# Patient Record
Sex: FEMALE | Race: WHITE | NOT HISPANIC OR LATINO | Employment: OTHER | URBAN - METROPOLITAN AREA
[De-identification: names, ages, dates, MRNs, and addresses within clinical notes are randomized per-mention and may not be internally consistent; named-entity substitution may affect disease eponyms.]

---

## 2017-01-03 RX ORDER — ANASTROZOLE 1 MG/1
TABLET ORAL
Qty: 30 TABLET | Refills: 0 | Status: SHIPPED | OUTPATIENT
Start: 2017-01-03 | End: 2017-01-11 | Stop reason: SDUPTHER

## 2017-01-11 RX ORDER — ANASTROZOLE 1 MG/1
1 TABLET ORAL DAILY
Qty: 30 TABLET | Refills: 0 | Status: SHIPPED | OUTPATIENT
Start: 2017-01-11 | End: 2017-01-31 | Stop reason: SDUPTHER

## 2017-01-11 NOTE — TELEPHONE ENCOUNTER
Refill request for Arimidex to be sent to [facility] pharmacy. Patient changing pharmacies. Refill escribed to [facility].

## 2017-01-31 RX ORDER — ANASTROZOLE 1 MG/1
TABLET ORAL
Qty: 30 TABLET | Refills: 2 | Status: SHIPPED | OUTPATIENT
Start: 2017-01-31 | End: 2018-07-16 | Stop reason: ALTCHOICE

## 2017-03-14 ENCOUNTER — OUTSIDE FACILITY SERVICE (OUTPATIENT)
Dept: FAMILY MEDICINE CLINIC | Facility: CLINIC | Age: 77
End: 2017-03-14

## 2017-03-14 PROCEDURE — 99305 1ST NF CARE MODERATE MDM 35: CPT | Performed by: INTERNAL MEDICINE

## 2017-03-16 RX ORDER — ALPRAZOLAM 1 MG/1
1 TABLET ORAL NIGHTLY PRN
Qty: 30 TABLET | Refills: 0 | Status: SHIPPED | OUTPATIENT
Start: 2017-03-16 | End: 2017-05-10 | Stop reason: SDUPTHER

## 2017-03-16 RX ORDER — HYDROCODONE BITARTRATE AND ACETAMINOPHEN 5; 325 MG/1; MG/1
1 TABLET ORAL EVERY 8 HOURS PRN
Qty: 90 TABLET | Refills: 0 | Status: SHIPPED | OUTPATIENT
Start: 2017-03-16 | End: 2017-06-02 | Stop reason: SDUPTHER

## 2017-04-11 ENCOUNTER — OUTSIDE FACILITY SERVICE (OUTPATIENT)
Dept: FAMILY MEDICINE CLINIC | Facility: CLINIC | Age: 77
End: 2017-04-11

## 2017-04-11 PROCEDURE — 99307 SBSQ NF CARE SF MDM 10: CPT | Performed by: NURSE PRACTITIONER

## 2017-05-10 RX ORDER — ALPRAZOLAM 1 MG/1
1 TABLET ORAL NIGHTLY PRN
Qty: 30 TABLET | Refills: 1 | Status: SHIPPED | OUTPATIENT
Start: 2017-05-10 | End: 2017-05-23 | Stop reason: SDUPTHER

## 2017-05-23 RX ORDER — ALPRAZOLAM 1 MG/1
1 TABLET ORAL NIGHTLY PRN
Qty: 30 TABLET | Refills: 0 | Status: SHIPPED | OUTPATIENT
Start: 2017-05-23

## 2017-06-02 RX ORDER — HYDROCODONE BITARTRATE AND ACETAMINOPHEN 5; 325 MG/1; MG/1
1 TABLET ORAL EVERY 8 HOURS PRN
Qty: 90 TABLET | Refills: 0 | Status: SHIPPED | OUTPATIENT
Start: 2017-06-02 | End: 2017-06-26 | Stop reason: SDUPTHER

## 2017-06-26 RX ORDER — HYDROCODONE BITARTRATE AND ACETAMINOPHEN 5; 325 MG/1; MG/1
1 TABLET ORAL EVERY 8 HOURS PRN
Qty: 90 TABLET | Refills: 0 | Status: SHIPPED | OUTPATIENT
Start: 2017-06-26

## 2017-06-30 ENCOUNTER — OUTSIDE FACILITY SERVICE (OUTPATIENT)
Dept: FAMILY MEDICINE CLINIC | Facility: CLINIC | Age: 77
End: 2017-06-30

## 2017-06-30 PROCEDURE — 99308 SBSQ NF CARE LOW MDM 20: CPT | Performed by: NURSE PRACTITIONER

## 2018-06-25 ENCOUNTER — OFFICE VISIT (OUTPATIENT)
Dept: CARDIOLOGY | Facility: CLINIC | Age: 78
End: 2018-06-25

## 2018-06-25 ENCOUNTER — HOSPITAL ENCOUNTER (OUTPATIENT)
Dept: CARDIOLOGY | Facility: HOSPITAL | Age: 78
Discharge: HOME | End: 2018-06-25
Attending: INTERNAL MEDICINE | Admitting: INTERNAL MEDICINE

## 2018-06-25 VITALS
BODY MASS INDEX: 27.75 KG/M2 | WEIGHT: 147 LBS | HEIGHT: 61 IN | HEART RATE: 94 BPM | SYSTOLIC BLOOD PRESSURE: 148 MMHG | DIASTOLIC BLOOD PRESSURE: 88 MMHG

## 2018-06-25 DIAGNOSIS — R06.09 DYSPNEA ON EXERTION: ICD-10-CM

## 2018-06-25 DIAGNOSIS — I48.0 PAROXYSMAL ATRIAL FIBRILLATION (HCC): ICD-10-CM

## 2018-06-25 DIAGNOSIS — I44.7 LBBB (LEFT BUNDLE BRANCH BLOCK): ICD-10-CM

## 2018-06-25 DIAGNOSIS — I44.7 LBBB (LEFT BUNDLE BRANCH BLOCK): Primary | ICD-10-CM

## 2018-06-25 LAB
ASCENDING AORTA: 2.9 CM
BH CV ECHO MEAS - ACS: 2 CM
BH CV ECHO MEAS - AO MAX PG (FULL): 5.3 MMHG
BH CV ECHO MEAS - AO MAX PG: 6.8 MMHG
BH CV ECHO MEAS - AO MEAN PG (FULL): 2.5 MMHG
BH CV ECHO MEAS - AO MEAN PG: 3.4 MMHG
BH CV ECHO MEAS - AO ROOT AREA (BSA CORRECTED): 1.9
BH CV ECHO MEAS - AO ROOT AREA: 7.8 CM^2
BH CV ECHO MEAS - AO ROOT DIAM: 3.1 CM
BH CV ECHO MEAS - AO V2 MAX: 130.3 CM/SEC
BH CV ECHO MEAS - AO V2 MEAN: 85 CM/SEC
BH CV ECHO MEAS - AO V2 VTI: 28.1 CM
BH CV ECHO MEAS - AVA(I,A): 1.4 CM^2
BH CV ECHO MEAS - AVA(I,D): 1.4 CM^2
BH CV ECHO MEAS - AVA(V,A): 1.4 CM^2
BH CV ECHO MEAS - AVA(V,D): 1.4 CM^2
BH CV ECHO MEAS - BSA(HAYCOCK): 1.7 M^2
BH CV ECHO MEAS - BSA: 1.7 M^2
BH CV ECHO MEAS - BZI_BMI: 27.8 KILOGRAMS/M^2
BH CV ECHO MEAS - BZI_METRIC_HEIGHT: 154.9 CM
BH CV ECHO MEAS - BZI_METRIC_WEIGHT: 66.7 KG
BH CV ECHO MEAS - CONTRAST EF (2CH): 35.4 ML/M^2
BH CV ECHO MEAS - CONTRAST EF 4CH: 44 ML/M^2
BH CV ECHO MEAS - EDV(MOD-SP2): 113 ML
BH CV ECHO MEAS - EDV(MOD-SP4): 134 ML
BH CV ECHO MEAS - EDV(TEICH): 97.8 ML
BH CV ECHO MEAS - EF(CUBED): 59.2 %
BH CV ECHO MEAS - EF(MOD-BP): 39 %
BH CV ECHO MEAS - EF(MOD-SP2): 35.4 %
BH CV ECHO MEAS - EF(MOD-SP4): 44 %
BH CV ECHO MEAS - EF(TEICH): 50.9 %
BH CV ECHO MEAS - ESV(MOD-SP2): 73 ML
BH CV ECHO MEAS - ESV(MOD-SP4): 75 ML
BH CV ECHO MEAS - ESV(TEICH): 48.1 ML
BH CV ECHO MEAS - FS: 25.8 %
BH CV ECHO MEAS - IVS/LVPW: 1.1
BH CV ECHO MEAS - IVSD: 0.96 CM
BH CV ECHO MEAS - LAT PEAK E' VEL: 4 CM/SEC
BH CV ECHO MEAS - LV DIASTOLIC VOL/BSA (35-75): 80.8 ML/M^2
BH CV ECHO MEAS - LV MASS(C)D: 138.7 GRAMS
BH CV ECHO MEAS - LV MASS(C)DI: 83.7 GRAMS/M^2
BH CV ECHO MEAS - LV MAX PG: 1.4 MMHG
BH CV ECHO MEAS - LV MEAN PG: 0.9 MMHG
BH CV ECHO MEAS - LV SYSTOLIC VOL/BSA (12-30): 45.3 ML/M^2
BH CV ECHO MEAS - LV V1 MAX: 60.1 CM/SEC
BH CV ECHO MEAS - LV V1 MEAN: 44 CM/SEC
BH CV ECHO MEAS - LV V1 VTI: 13.2 CM
BH CV ECHO MEAS - LVIDD: 4.6 CM
BH CV ECHO MEAS - LVIDS: 3.4 CM
BH CV ECHO MEAS - LVLD AP2: 6.6 CM
BH CV ECHO MEAS - LVLD AP4: 7 CM
BH CV ECHO MEAS - LVLS AP2: 6.1 CM
BH CV ECHO MEAS - LVLS AP4: 6.4 CM
BH CV ECHO MEAS - LVOT AREA (M): 2.8 CM^2
BH CV ECHO MEAS - LVOT AREA: 2.9 CM^2
BH CV ECHO MEAS - LVOT DIAM: 1.9 CM
BH CV ECHO MEAS - LVPWD: 0.85 CM
BH CV ECHO MEAS - MED PEAK E' VEL: 4 CM/SEC
BH CV ECHO MEAS - MR MAX PG: 99.8 MMHG
BH CV ECHO MEAS - MR MAX VEL: 499.5 CM/SEC
BH CV ECHO MEAS - MV A DUR: 0.12 SEC
BH CV ECHO MEAS - MV A MAX VEL: 117.4 CM/SEC
BH CV ECHO MEAS - MV DEC SLOPE: 613.6 CM/SEC^2
BH CV ECHO MEAS - MV DEC TIME: 0.14 SEC
BH CV ECHO MEAS - MV E MAX VEL: 92.6 CM/SEC
BH CV ECHO MEAS - MV E/A: 0.79
BH CV ECHO MEAS - MV MAX PG: 6.8 MMHG
BH CV ECHO MEAS - MV MEAN PG: 2.9 MMHG
BH CV ECHO MEAS - MV P1/2T MAX VEL: 94.1 CM/SEC
BH CV ECHO MEAS - MV P1/2T: 44.9 MSEC
BH CV ECHO MEAS - MV V2 MAX: 130.3 CM/SEC
BH CV ECHO MEAS - MV V2 MEAN: 78.1 CM/SEC
BH CV ECHO MEAS - MV V2 VTI: 33 CM
BH CV ECHO MEAS - MVA P1/2T LCG: 2.3 CM^2
BH CV ECHO MEAS - MVA(P1/2T): 4.9 CM^2
BH CV ECHO MEAS - MVA(VTI): 1.2 CM^2
BH CV ECHO MEAS - PA ACC TIME: 0.13 SEC
BH CV ECHO MEAS - PA MAX PG (FULL): 0.57 MMHG
BH CV ECHO MEAS - PA MAX PG: 2.3 MMHG
BH CV ECHO MEAS - PA PR(ACCEL): 20.4 MMHG
BH CV ECHO MEAS - PA V2 MAX: 76 CM/SEC
BH CV ECHO MEAS - PULM A REVS DUR: 0.07 SEC
BH CV ECHO MEAS - PULM A REVS VEL: 36.9 CM/SEC
BH CV ECHO MEAS - PULM DIAS VEL: 40.2 CM/SEC
BH CV ECHO MEAS - PULM S/D: 0.79
BH CV ECHO MEAS - PULM SYS VEL: 31.8 CM/SEC
BH CV ECHO MEAS - PVA(V,A): 2.8 CM^2
BH CV ECHO MEAS - PVA(V,D): 2.8 CM^2
BH CV ECHO MEAS - QP/QS: 1.1
BH CV ECHO MEAS - RAP SYSTOLE: 3 MMHG
BH CV ECHO MEAS - RV MAX PG: 1.7 MMHG
BH CV ECHO MEAS - RV MEAN PG: 1 MMHG
BH CV ECHO MEAS - RV V1 MAX: 66 CM/SEC
BH CV ECHO MEAS - RV V1 MEAN: 48.6 CM/SEC
BH CV ECHO MEAS - RV V1 VTI: 12.7 CM
BH CV ECHO MEAS - RVOT AREA: 3.3 CM^2
BH CV ECHO MEAS - RVOT DIAM: 2 CM
BH CV ECHO MEAS - SI(AO): 131.9 ML/M^2
BH CV ECHO MEAS - SI(CUBED): 35 ML/M^2
BH CV ECHO MEAS - SI(LVOT): 23.4 ML/M^2
BH CV ECHO MEAS - SI(MOD-SP2): 24.1 ML/M^2
BH CV ECHO MEAS - SI(MOD-SP4): 35.6 ML/M^2
BH CV ECHO MEAS - SI(TEICH): 30 ML/M^2
BH CV ECHO MEAS - SUP REN AO DIAM: 1.9 CM
BH CV ECHO MEAS - SV(AO): 218.7 ML
BH CV ECHO MEAS - SV(CUBED): 58 ML
BH CV ECHO MEAS - SV(LVOT): 38.8 ML
BH CV ECHO MEAS - SV(MOD-SP2): 40 ML
BH CV ECHO MEAS - SV(MOD-SP4): 59 ML
BH CV ECHO MEAS - SV(RVOT): 41.5 ML
BH CV ECHO MEAS - SV(TEICH): 49.7 ML
BH CV ECHO MEAS - TAPSE (>1.6): 2.6 CM2
BH CV ECHO MEASUREMENTS AVERAGE E/E' RATIO: 23.15
BH CV XLRA - RV BASE: 2.8 CM
BH CV XLRA - TDI S': 9 CM/SEC
LEFT ATRIUM VOLUME INDEX: 24 ML/M2
LV EF 2D ECHO EST: 40 %
MAXIMAL PREDICTED HEART RATE: 144 BPM
SINUS: 2.6 CM
STJ: 2.1 CM
STRESS TARGET HR: 122 BPM

## 2018-06-25 PROCEDURE — 93000 ELECTROCARDIOGRAM COMPLETE: CPT | Performed by: INTERNAL MEDICINE

## 2018-06-25 PROCEDURE — 93306 TTE W/DOPPLER COMPLETE: CPT

## 2018-06-25 PROCEDURE — 99204 OFFICE O/P NEW MOD 45 MIN: CPT | Performed by: INTERNAL MEDICINE

## 2018-06-25 PROCEDURE — 93306 TTE W/DOPPLER COMPLETE: CPT | Performed by: INTERNAL MEDICINE

## 2018-06-25 PROCEDURE — 25010000002 PERFLUTREN (DEFINITY) 8.476 MG IN SODIUM CHLORIDE 0.9 % 10 ML INJECTION: Performed by: INTERNAL MEDICINE

## 2018-06-25 RX ORDER — MIRTAZAPINE 15 MG/1
15 TABLET, FILM COATED ORAL NIGHTLY
COMMUNITY

## 2018-06-25 RX ORDER — OMEPRAZOLE 40 MG/1
40 CAPSULE, DELAYED RELEASE ORAL DAILY
COMMUNITY

## 2018-06-25 RX ORDER — ZOLPIDEM TARTRATE 5 MG/1
5 TABLET ORAL NIGHTLY PRN
COMMUNITY

## 2018-06-25 RX ORDER — ASPIRIN 81 MG/1
81 TABLET ORAL DAILY
COMMUNITY

## 2018-06-25 RX ORDER — LORATADINE 10 MG/1
1 CAPSULE, LIQUID FILLED ORAL DAILY
COMMUNITY

## 2018-06-25 RX ADMIN — PERFLUTREN 1.5 ML: 6.52 INJECTION, SUSPENSION INTRAVENOUS at 12:45

## 2018-06-25 NOTE — PROGRESS NOTES
Marah JAVIER Joyce  1940  Date of Office Visit: 06/25/2018  Encounter Provider: Franco Araujo MD  Place of Service: [facility] CARDIOLOGY      CHIEF COMPLAINT:  Left bundle-branch block  Reported history of heart failure    HISTORY OF PRESENT ILLNESS:Ms. Love:    I had the pleasure of seeing the patient in consultation today.  As you well know, she is a very pleasant, 78-year-old female with a medical history of breast malignancy with prior Adriamycin therapy, osteopenia, and osteoarthritis who presents to me with a report of an isolated episode of shortness of breath about three months ago and a questionable history of congestive heart failure.  She states that, about three months ago, she had shortness of air after walking from back from the bathroom.  She denied any chest pain.  This actually resolved with rest.  She has had no orthopnea or paroxysmal nocturnal dyspnea.  She denies lower extremity edema.  She has a previously documented normal ejection fraction on MUGA scans.     She denies any chest pain or dyspnea on exertion currently.  She has had no recurrence of that shortness of breath.         Review of Systems   Constitution: Negative for fever, weakness and malaise/fatigue.   HENT: Negative for nosebleeds and sore throat.    Eyes: Negative for blurred vision and double vision.   Cardiovascular: Negative for chest pain, claudication, palpitations and syncope.   Respiratory: Negative for cough, shortness of breath and snoring.    Endocrine: Negative for cold intolerance, heat intolerance and polydipsia.   Skin: Negative for itching, poor wound healing and rash.   Musculoskeletal: Negative for joint pain, joint swelling, muscle weakness and myalgias.   Gastrointestinal: Negative for abdominal pain, melena, nausea and vomiting.   Neurological: Negative for light-headedness, loss of balance, seizures and vertigo.   Psychiatric/Behavioral: Negative for altered mental  status and depression.          Past Medical History:   Diagnosis Date   • Anemia    • Anxiety    • Arthritis    • Cancer     Stage IIIA, T1N2M0 right breast   • Cellulitis of right upper arm    • Cerebral aneurysm    • Depression    • Diabetes mellitus     type II   • Diabetic neuropathy    • Difficulty walking    • DVT (deep venous thrombosis) 2011    Left upper extremity   • GERD (gastroesophageal reflux disease)    • Heart failure    • Hypercholesteremia    • Hyperlipidemia    • Hypertension    • Hypokalemia    • Lack of coordination    • Muscle weakness    • Osteopenia    • Synovial cyst of popliteal space (Baker), left knee    • Synovial cyst of popliteal space (Baker), right knee    • Thrombocytopenia        The following portions of the patient's history were reviewed and updated as appropriate: Social history , Family history and Surgical history     Current Outpatient Prescriptions on File Prior to Visit   Medication Sig Dispense Refill   • ALPRAZolam (XANAX) 1 MG tablet Take 1 tablet by mouth At Night As Needed for Anxiety. [place] 30 tablet 0   • anastrozole (ARIMIDEX) 1 MG tablet TAKE 1 TABLET BY MOUTH ONCE DAILY 30 tablet 2   • atorvastatin (LIPITOR) 40 MG tablet Take 40 mg by mouth.     • gabapentin (NEURONTIN) 300 MG capsule Take 300 mg by mouth 3 (Three) Times a Day.     • HYDROcodone-acetaminophen (NORCO) 5-325 MG per tablet Take 1 tablet by mouth Every 8 (Eight) Hours As Needed (pain). 90 tablet 0   • insulin glargine (LANTUS) 100 UNIT/ML injection Inject  under the skin daily.     • insulin lispro (HumaLOG) 100 UNIT/ML injection Inject  under the skin 3 (three) times a day before meals.     • lisinopril (PRINIVIL,ZESTRIL) 20 MG tablet 20 mg 2 (Two) Times a Day.     • meloxicam (MOBIC) 7.5 MG tablet      • Multiple Vitamins-Minerals (CENTRUM SILVER PO) Take  by mouth Daily.     • Omega-3 Fatty Acids (FISH OIL PO) Take 1,200 mg by mouth Daily.     • [DISCONTINUED] pantoprazole (PROTONIX) 40 MG EC  "tablet Take 40 mg by mouth.     • [DISCONTINUED] risperiDONE (risperDAL) 0.5 MG tablet 0.5 mg 2 (Two) Times a Day.       No current facility-administered medications on file prior to visit.        Allergies   Allergen Reactions   • Benadryl [Diphenhydramine] Hallucinations   • Diphenhydramine Hcl Hallucinations     Hallucinations       Vitals:    06/25/18 1045   BP: 148/88   Pulse: 94   Weight: 66.7 kg (147 lb)   Height: 154.9 cm (61\")     Physical Exam   Constitutional: She is oriented to person, place, and time. She appears well-developed and well-nourished.   HENT:   Head: Normocephalic and atraumatic.   Eyes: Conjunctivae and EOM are normal. No scleral icterus.   Neck: Normal range of motion. Neck supple. Normal carotid pulses, no hepatojugular reflux and no JVD present. Carotid bruit is not present. No tracheal deviation present. No thyromegaly present.   Cardiovascular: Normal rate and regular rhythm.  Exam reveals no gallop and no friction rub.    No murmur heard.  Pulses:       Carotid pulses are 2+ on the right side, and 2+ on the left side.       Radial pulses are 2+ on the right side, and 2+ on the left side.        Femoral pulses are 2+ on the right side, and 2+ on the left side.       Dorsalis pedis pulses are 2+ on the right side, and 2+ on the left side.        Posterior tibial pulses are 2+ on the right side, and 2+ on the left side.   Pulmonary/Chest: Breath sounds normal. No respiratory distress. She has no decreased breath sounds. She has no wheezes. She has no rhonchi. She has no rales. She exhibits no tenderness.   Abdominal: Soft. Bowel sounds are normal. She exhibits no distension. There is no tenderness. There is no rebound.   Musculoskeletal: She exhibits no edema or deformity.   Neurological: She is alert and oriented to person, place, and time. She has normal strength. No sensory deficit.   Skin: No rash noted. No erythema.   Psychiatric: She has a normal mood and affect. Her behavior is " normal.       No components found for: CBC  No results found for: CMP  No components found for: LIPID  No results found for: BMP      ECG 12 Lead  Date/Time: 6/25/2018 11:15 AM  Performed by: JEANA WONG  Authorized by: JEANA WONG   Comparison: compared with previous ECG from 6/19/2013  Comparison to previous ECG: Left bundle branch block is new  Rhythm: sinus rhythm  Rate: normal  Conduction: left bundle branch block  Clinical impression: abnormal ECG            DISCUSSION/SUMMARYThe patient is a very pleasant, 78-year-old female with a history of breast cancer, Adriamycin use, and a previously documented normal ejection fraction on MUGA scan who presents to me for evaluation prior to traveling to [place].  She has on only one episode of dyspnea.  She is euvolemic and has no evidence of angina.    Her electrocardiogram shows a new left bundle branch block since 2013.     Abnormal electrocardiogram.  - I do think getting an echocardiogram on her, considering her questionable history of congestive heart failure and her now-present left bundle branch block, which was not present about five years ago, would be reasonable.  - I do not think she needs an ischemic evaluation.  - I would recommend continuing her current medications and monitoring of her mildly elevated blood pressure.

## 2018-06-28 ENCOUNTER — TELEPHONE (OUTPATIENT)
Dept: ONCOLOGY | Facility: CLINIC | Age: 78
End: 2018-06-28

## 2018-06-28 ENCOUNTER — TELEPHONE (OUTPATIENT)
Dept: ONCOLOGY | Facility: HOSPITAL | Age: 78
End: 2018-06-28

## 2018-06-28 NOTE — TELEPHONE ENCOUNTER
DIDN'T SEE THAT THIS HAD ALREADY BEEN HANDLED. CALLED PT AND SUGGESTED THAT DR. QUEEN BE ASKED IF SHE SHOULD MOVE HER APPT UP OR NOT. WILL MESSAGE DR. QUEEN. PT V/U.

## 2018-06-28 NOTE — TELEPHONE ENCOUNTER
----- Message from Callie Ba sent at 6/28/2018  3:57 PM EDT -----  Regarding: FW: breast issue--this may be a repeat      ----- Message -----  From: Giovanny Lawson Jr., MD  Sent: 6/28/2018   3:25 PM  To: Callie Ba, #  Subject: RE: breast issue                                 This needs to go to triage nurses  ----- Message -----  From: Callie Ba  Sent: 6/28/2018   3:18 PM  To: Giovanny Lawson Jr., MD  Subject: breast issue                                     Having pain in both breast more so on the right.    [phone]

## 2018-06-28 NOTE — TELEPHONE ENCOUNTER
Called patient back, hasnt been here since dec 2016. States she is having pain on the outside of both breast, no redness, no lumps no swelling. States she thinks it could be muscular, just isnt sure. She has appnt with MD at end of July told her she could keep that but also dago lPCP to get in sooner to see if they had any ideas/ Pt v/u    ----- Message from Giovanny Lawson Jr., MD sent at 6/28/2018  3:25 PM EDT -----  Regarding: RE: breast issue  This needs to go to triage nurses  ----- Message -----  From: Callie Ba  Sent: 6/28/2018   3:18 PM  To: Giovanny Lawson Jr., MD  Subject: breast issue                                     Having pain in both breast more so on the right.    [phone]

## 2018-07-02 ENCOUNTER — LAB (OUTPATIENT)
Dept: LAB | Facility: HOSPITAL | Age: 78
End: 2018-07-02

## 2018-07-02 ENCOUNTER — OFFICE VISIT (OUTPATIENT)
Dept: ONCOLOGY | Facility: CLINIC | Age: 78
End: 2018-07-02

## 2018-07-02 VITALS
RESPIRATION RATE: 16 BRPM | WEIGHT: 150.2 LBS | OXYGEN SATURATION: 96 % | HEART RATE: 84 BPM | BODY MASS INDEX: 28.36 KG/M2 | DIASTOLIC BLOOD PRESSURE: 80 MMHG | SYSTOLIC BLOOD PRESSURE: 132 MMHG | TEMPERATURE: 98.7 F | HEIGHT: 61 IN

## 2018-07-02 DIAGNOSIS — M79.621 PAIN IN RIGHT AXILLA: ICD-10-CM

## 2018-07-02 DIAGNOSIS — L76.82 INCISIONAL PAIN: ICD-10-CM

## 2018-07-02 DIAGNOSIS — C50.811 MALIGNANT NEOPLASM OF OVERLAPPING SITES OF RIGHT FEMALE BREAST, UNSPECIFIED ESTROGEN RECEPTOR STATUS (HCC): Primary | ICD-10-CM

## 2018-07-02 DIAGNOSIS — M79.622 AXILLARY PAIN, LEFT: ICD-10-CM

## 2018-07-02 LAB
ANION GAP SERPL CALCULATED.3IONS-SCNC: 11.2 MMOL/L
BASOPHILS # BLD AUTO: 0.03 10*3/MM3 (ref 0–0.1)
BASOPHILS NFR BLD AUTO: 0.5 % (ref 0–1.1)
BUN BLD-MCNC: 19 MG/DL (ref 6–20)
BUN/CREAT SERPL: 21.3 (ref 7.3–30)
CALCIUM SPEC-SCNC: 9.7 MG/DL (ref 8.5–10.2)
CHLORIDE SERPL-SCNC: 102 MMOL/L (ref 98–107)
CO2 SERPL-SCNC: 27.8 MMOL/L (ref 22–29)
CREAT BLD-MCNC: 0.89 MG/DL (ref 0.6–1.1)
DEPRECATED RDW RBC AUTO: 43.9 FL (ref 37–49)
EOSINOPHIL # BLD AUTO: 0.06 10*3/MM3 (ref 0–0.36)
EOSINOPHIL NFR BLD AUTO: 1 % (ref 1–5)
ERYTHROCYTE [DISTWIDTH] IN BLOOD BY AUTOMATED COUNT: 13.4 % (ref 11.7–14.5)
GFR SERPL CREATININE-BSD FRML MDRD: 62 ML/MIN/1.73
GLUCOSE BLD-MCNC: 168 MG/DL (ref 74–124)
HCT VFR BLD AUTO: 37.2 % (ref 34–45)
HGB BLD-MCNC: 12.1 G/DL (ref 11.5–14.9)
IMM GRANULOCYTES # BLD: 0.02 10*3/MM3 (ref 0–0.03)
IMM GRANULOCYTES NFR BLD: 0.3 % (ref 0–0.5)
LYMPHOCYTES # BLD AUTO: 1.01 10*3/MM3 (ref 1–3.5)
LYMPHOCYTES NFR BLD AUTO: 17.3 % (ref 20–49)
MCH RBC QN AUTO: 29.6 PG (ref 27–33)
MCHC RBC AUTO-ENTMCNC: 32.5 G/DL (ref 32–35)
MCV RBC AUTO: 91 FL (ref 83–97)
MONOCYTES # BLD AUTO: 0.3 10*3/MM3 (ref 0.25–0.8)
MONOCYTES NFR BLD AUTO: 5.1 % (ref 4–12)
NEUTROPHILS # BLD AUTO: 4.41 10*3/MM3 (ref 1.5–7)
NEUTROPHILS NFR BLD AUTO: 75.8 % (ref 39–75)
NRBC BLD MANUAL-RTO: 0 /100 WBC (ref 0–0)
PLATELET # BLD AUTO: 138 10*3/MM3 (ref 150–375)
PMV BLD AUTO: 12 FL (ref 8.9–12.1)
POTASSIUM BLD-SCNC: 4.5 MMOL/L (ref 3.5–4.7)
RBC # BLD AUTO: 4.09 10*6/MM3 (ref 3.9–5)
SODIUM BLD-SCNC: 141 MMOL/L (ref 134–145)
WBC NRBC COR # BLD: 5.83 10*3/MM3 (ref 4–10)

## 2018-07-02 PROCEDURE — 36415 COLL VENOUS BLD VENIPUNCTURE: CPT | Performed by: NURSE PRACTITIONER

## 2018-07-02 PROCEDURE — 80048 BASIC METABOLIC PNL TOTAL CA: CPT | Performed by: NURSE PRACTITIONER

## 2018-07-02 PROCEDURE — 36416 COLLJ CAPILLARY BLOOD SPEC: CPT | Performed by: NURSE PRACTITIONER

## 2018-07-02 PROCEDURE — 85025 COMPLETE CBC W/AUTO DIFF WBC: CPT | Performed by: NURSE PRACTITIONER

## 2018-07-02 PROCEDURE — 99214 OFFICE O/P EST MOD 30 MIN: CPT | Performed by: NURSE PRACTITIONER

## 2018-07-03 ENCOUNTER — TELEPHONE (OUTPATIENT)
Dept: CARDIOLOGY | Facility: CLINIC | Age: 78
End: 2018-07-03

## 2018-07-03 DIAGNOSIS — R94.31 ABNORMAL ELECTROCARDIOGRAM: ICD-10-CM

## 2018-07-03 DIAGNOSIS — I42.9 CARDIOMYOPATHY, UNSPECIFIED TYPE (HCC): Primary | ICD-10-CM

## 2018-07-03 RX ORDER — CARVEDILOL 3.12 MG/1
3.12 TABLET ORAL 2 TIMES DAILY
Qty: 60 TABLET | Refills: 11 | Status: SHIPPED | OUTPATIENT
Start: 2018-07-03

## 2018-07-03 NOTE — TELEPHONE ENCOUNTER
I called pt to get stress test schedule pt states she wants to hold off she doesn't think she wants to have it done and she would call back.

## 2018-07-03 NOTE — PROGRESS NOTES
Subjective .     REASONS FOR FOLLOWUP:    1. Stage IIIA (T1N2M0) right breast cancer status post bilateral mastectomies 10/13/2008, ER negative, CT positive, HER-2/stanley 3+.  Status post adjuvant chemotherapy with Adriamycin and Cytoxan x 4 cycles plus Taxotere x 4 cycles completed 06/24/2009. Initiation of adjuvant Arimidex in 07/2009 with plans to treat for 5 years.     2. Herceptin was discontinued due to asymptomatic decrease in ejection fraction.  Ejection fraction recovered to 52%.   3. The patient finished adjuvant radiation therapy 12/2009 through 02/2010 by Dr. Yuri Joyce.   4. Left upper extremity DVT secondary to Mediport, treated with Coumadin from February 2011 until April 2011.    5. History of iron deficiency anemia.  6. Intermittent mild leukopenia since completion of chemotherapy.    7. Osteopenia, treated with Reclast 01/30/2013 and 2/21/14.  Continuation of calcium and vitamin D supplementation.  Repeat DEXA bone density scan reported stable osteopenia on 02/14/2014 (maximal T score -2.1).     HISTORY OF PRESENT ILLNESS:  The patient is a 78 y.o. year old female who is here for follow-up with the above-mentioned history.    History of Present Illness  the patient is here today for a triage visit due to pain and bilaterally in the chest area at site of meniscectomy, greatest on right side.  This is been present for a couple of months however she has not come in to be seen.  She was last seen in December 2016.  She states she is actually moving to [place] at the end of this month and would like to have this pain checked out prior to moving.  She denies any skin rash in the area.  She denies known not nodules but states there is discomfort under her right arm sometimes with movement and sometimes at rest.  She also noticed noticed discomfort along her incision side line on the right side as well as under the left arm.  Eyes fevers, weight loss, trouble with appetite.      PAST MEDICAL HISTORY:     1. Diabetes mellitus with mild diabetic neuropathy involving hands and feet as well as retinopathy.  2. Hypertension.  3. Gastroesophageal reflux disease.  4. Osteoarthritis with chronic pain involving the knees with some limitation of mobility.  5. History of depression/anxiety.    6. Hyperlipidemia.   7. Chronic obstructive pulmonary disease/chronic bronchitis.    8. History of cerebral aneurysm by patient’s report with prior MRI performed at [facility].  The patient has seen Dr. Lizarraga referral from Dr. Cronin but did not followup regarding his final recommendations for treatment. Status post cholecystectomy in 2008.  Repeat MRI/MRA 11/10/2008 at 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  The patient was referred back to Dr. Lizarraga who did not feel any immediate intervention was required in 12/2008.  9. Status post multiple hand surgeries including bilateral carpal tunnel surgery, right wrist/tendon surgery, release of trigger finger.  10. Status post partial hysterectomy and bilateral repair performed in late 1990s.  Ovaries remain in place.   11. Status post bilateral tubal ligation.    12. Osteopenia with bone density study from July 2008 showing evidence of normal density in lumbar spine, osteopenia in femoral neck on right with T score of -1.2 and on left -1.4.    13. Urinary tract infection after first cycle of Taxotere therapy in April 2009.  14. Increasing lower back pain radiating down her legs; referral to Dr. Gonzáles for possible epidural injections.  15. Chest wall cellulitis and fat necrosis on the right wall requiring surgery for debridement on 12/13/2008.  Recurrent seroma requiring repeat drainage by Dr. Britt.  Area of presumed fat necrosis being followed in the lateral aspect of the mastectomy incision on the right.    ONCOLOGIC HISTORY:  (History from previous dates can be found in the separate  document.)    Current Outpatient Prescriptions on File Prior to Visit   Medication Sig Dispense Refill   • ALPRAZolam (XANAX) 1 MG tablet Take 1 tablet by mouth At Night As Needed for Anxiety. [place] 30 tablet 0   • anastrozole (ARIMIDEX) 1 MG tablet TAKE 1 TABLET BY MOUTH ONCE DAILY 30 tablet 2   • aspirin 81 MG EC tablet Take 81 mg by mouth Daily.     • atorvastatin (LIPITOR) 40 MG tablet Take 40 mg by mouth.     • gabapentin (NEURONTIN) 300 MG capsule Take 300 mg by mouth 3 (Three) Times a Day.     • HYDROcodone-acetaminophen (NORCO) 5-325 MG per tablet Take 1 tablet by mouth Every 8 (Eight) Hours As Needed (pain). 90 tablet 0   • insulin glargine (LANTUS) 100 UNIT/ML injection Inject  under the skin daily.     • insulin lispro (HumaLOG) 100 UNIT/ML injection Inject  under the skin 3 (three) times a day before meals.     • lisinopril (PRINIVIL,ZESTRIL) 20 MG tablet 20 mg 2 (Two) Times a Day.     • Loratadine 10 MG capsule Take 1 tablet by mouth Daily.     • meloxicam (MOBIC) 7.5 MG tablet      • mirtazapine (REMERON) 15 MG tablet Take 15 mg by mouth Every Night.     • Multiple Vitamins-Minerals (CENTRUM SILVER PO) Take  by mouth Daily.     • Omega-3 Fatty Acids (FISH OIL PO) Take 1,200 mg by mouth Daily.     • omeprazole (priLOSEC) 40 MG capsule Take 40 mg by mouth Daily.     • zolpidem (AMBIEN) 5 MG tablet Take 5 mg by mouth At Night As Needed for Sleep.       No current facility-administered medications on file prior to visit.        ALLERGIES:     Allergies   Allergen Reactions   • Benadryl [Diphenhydramine] Hallucinations   • Diphenhydramine Hcl Hallucinations     Hallucinations     SOCIAL HISTORY:    The patient is  and lives alone.   She has a daughter who is involved in her care but lives in [place].   She has another daughter who lives in [place] who commutes daily to [place] to work.   The patient has a history of smoking one-half pack per day for 10 years and quit around 2002.       FAMILY  HISTORY:   Significant for a brother with lung cancer at age 55.  There is no FH of breast cancer or ovarian cancer.   The patient does note a FH of easy bruising although her mother who had easy bruising was on steroids.   Her father  of coronary artery disease.   Mother and brother both had diabetes.         Review of Systems   Constitutional: Positive for fatigue. Negative for activity change, appetite change, fever and unexpected weight change.   HENT: Negative for congestion, mouth sores, nosebleeds, sore throat and voice change.    Respiratory: Negative for cough, shortness of breath and wheezing.    Cardiovascular: Negative for chest pain, palpitations and leg swelling.   Gastrointestinal: Negative for abdominal distention, abdominal pain, blood in stool, constipation, diarrhea, nausea and vomiting.   Endocrine: Negative for cold intolerance and heat intolerance.   Genitourinary: Negative for difficulty urinating, dysuria, frequency and hematuria.   Musculoskeletal: Positive for arthralgias and back pain. Negative for joint swelling and myalgias.        Pain under right and left arm as well as right lower chest at previous mastectomy incision line.   Skin: Negative for rash.   Neurological: Negative for dizziness, syncope, weakness, light-headedness, numbness and headaches.   Hematological: Negative for adenopathy. Does not bruise/bleed easily.   Psychiatric/Behavioral: Negative for confusion and sleep disturbance. The patient is not nervous/anxious.          Objective      Vitals:    18 1340   BP: 132/80   Pulse: 84   Resp: 16   Temp: 98.7 °F (37.1 °C)   SpO2: 96%      Current Status 2018   ECOG score 0   Pain 6/10    Physical Exam   Constitutional: She is oriented to person, place, and time. She appears well-developed and well-nourished.   HENT:   Mouth/Throat: Oropharynx is clear and moist.   Eyes: Conjunctivae are normal.   Neck: No thyromegaly present.   Cardiovascular: Normal rate and  regular rhythm.  Exam reveals no gallop and no friction rub.    No murmur heard.  Pulmonary/Chest: Breath sounds normal. No respiratory distress. She exhibits no mass (status post bilateral mastectomy.  Chest wall bilaterally is normal without masses no abnormalities palpated.).   Tender to palpation bilateral axilla, right greater than left as well as right lower chest at meniscectomy incision line.   Abdominal: Soft. Bowel sounds are normal. She exhibits no distension. There is no tenderness.   Musculoskeletal: She exhibits no edema.   Lymphadenopathy:     She has no cervical adenopathy.     She has no axillary adenopathy.        Right: No supraclavicular adenopathy present.        Left: No supraclavicular adenopathy present.   Neurological: She is alert and oriented to person, place, and time. She displays normal reflexes. No cranial nerve deficit. She exhibits normal muscle tone.   Skin: Skin is warm and dry. No rash noted.   Psychiatric: She has a normal mood and affect. Her behavior is normal.       RECENT LABS:  Hematology WBC   Date Value Ref Range Status   07/02/2018 5.83 4.00 - 10.00 10*3/mm3 Final     RBC   Date Value Ref Range Status   07/02/2018 4.09 3.90 - 5.00 10*6/mm3 Final     Hemoglobin   Date Value Ref Range Status   07/02/2018 12.1 11.5 - 14.9 g/dL Final     Hematocrit   Date Value Ref Range Status   07/02/2018 37.2 34.0 - 45.0 % Final     Platelets   Date Value Ref Range Status   07/02/2018 138 (L) 150 - 375 10*3/mm3 Final        Lab Results   Component Value Date    GLUCOSE 168 (H) 07/02/2018    BUN 19 07/02/2018    CREATININE 0.89 07/02/2018    EGFRIFNONA 62 07/02/2018    EGFRIFAFRI  09/23/2016      Comment:      <15 Indicative of kidney failure.    BCR 21.3 07/02/2018    CO2 27.8 07/02/2018    CALCIUM 9.7 07/02/2018    ALBUMIN 4.40 09/23/2016    LABIL2 1.4 09/23/2016    AST 32 09/23/2016    ALT 22 09/23/2016       Assessment/Plan    1. History of stage IIIA (T1N2M0) right breast cancer:  status post multimodality therapy with bilateral mastectomy, adjuvant chemotherapy with Adriamycin and Cytoxan plus Taxotere. The patient was HER-2 positive but able to tolerate Herceptin because of declined ejection fraction and was discontinued.  The patient was ER negative, borderline GA positive (10%) on initial biopsy specimen.  On subsequent evaluation of lymph node specimen from her mastectomy she was ER negative, GA negative, and HER-2/stanley 3+.  She has received anastrazole since July 2009 due to her lack of ability to receive the full course of Herceptin and related to her borderline radius GA positivity and she has tolerated treatment well. When seen in February 2014 plans were made to continue anastrazole post 5 years of therapy due to the high-risk nature of her disease.  When seen, last she was experiencing fairly severe arthritic symptoms in her back and knees which is a chronic issue.  I do wonder however to what degree Arimidex may be contributing to her symptoms.  Given that her tumor is ER negative and only on the initial biopsy was borderline GA +10%, subsequently GA negative on the lymph node biopsy from her mastectomy, I question the utility of further treatment with Arimidex.  I do believe we also need to evaluate the status of her bone density in making any decisions regarding continued treatment with the drug.  The plan was for her return in a month after the last visit due to review DEXA scan and discuss further need for treatment however the patient did not keep her planned appointment.  She comes in today with pain in her bilateral chest area, particularly under both axilla as well as the incision line on her right chest.  This is discussed with Dr. Renny Grayson the patient for a CT of the chest to be done next week with review by him the week following.  This would be prior to her moving to [place] and we discussed the importance of getting this done in keeping follow-up prior to moving.   The patient is unsure if she is taking her Arimidex and will call the office back.   2. Osteopenia: The patient's last DEXA scan was 2/11/14 with a maximal T score in the left femoral neck of -2.1.  She has received Zometa on 2 occasions, 1/30/13 and again in 2/21/14.  As above, we has planned to assess her bone density in December 2016 and possibly give Reclast pending her bone density and whether or not we are going to continue on with Arimidex, however the patient was lost to follow-up.  She wishes to not proceed with further testing at this time since she is moving to [place].  3. Severe osteoarthritis: The patient has severe involvement of her knees as well as her back continuing on every 3 month epidural injections and steroid knee injections.  She apparently is felt to be a poor surgical candidate for knee replacement due to her comorbidities and age.  4. Pain in bilateral chest, specifically bilateral axilla and right mastectomy incision line.  This is discussed with Dr. Lawson and we will proceed with a CT of the chest to evaluate for further metastatic disease.  She most recently had a CT of the chest in December 2014.  Patient was agreeable to this plan of care.  5. Right arm lymphedema.  The patient not go to lymphedema clinic for further evaluation.  I have suggested she do that today at the visit however she does not wish to start any new treatments prior to moving to [place].    Plan:  1. CT of the chest with contrast next week.  2. Follow-up with Dr. Lawson in 2 weeks to review the CT scan.  3. To call back to let her office know if she is still taking Arimidex.  This was last filled in January 2017.  This did have refills however so we are unsure if she is taking currently.  Patient did call back stating she is taking the medication.

## 2018-07-03 NOTE — TELEPHONE ENCOUNTER
----- Message from Franco Araujo MD sent at 7/3/2018  2:43 PM EDT -----  Spoke with the patient.  She needs a Lexiscan stress test and a beta blocker.  I called and carvedilol.  Please make sure they schedule her stress soon because she wants to leave to fly to [place]  ----- Message -----  From: Sona Moy MA  Sent: 7/3/2018   2:24 PM  To: Franco Araujo MD    Pt called back returning your call. She can be reached at #[phone].    Sona

## 2018-07-09 ENCOUNTER — HOSPITAL ENCOUNTER (OUTPATIENT)
Dept: PET IMAGING | Facility: HOSPITAL | Age: 78
Discharge: HOME | End: 2018-07-09
Attending: INTERNAL MEDICINE | Admitting: INTERNAL MEDICINE

## 2018-07-09 DIAGNOSIS — M79.621 PAIN IN RIGHT AXILLA: ICD-10-CM

## 2018-07-09 DIAGNOSIS — L76.82 INCISIONAL PAIN: ICD-10-CM

## 2018-07-09 DIAGNOSIS — C50.811 MALIGNANT NEOPLASM OF OVERLAPPING SITES OF RIGHT FEMALE BREAST, UNSPECIFIED ESTROGEN RECEPTOR STATUS (HCC): ICD-10-CM

## 2018-07-09 DIAGNOSIS — M79.622 AXILLARY PAIN, LEFT: ICD-10-CM

## 2018-07-09 LAB — CREAT BLDA-MCNC: 1.2 MG/DL (ref 0.6–1.3)

## 2018-07-09 PROCEDURE — 71260 CT THORAX DX C+: CPT

## 2018-07-09 PROCEDURE — 25010000002 IOPAMIDOL 61 % SOLUTION: Performed by: INTERNAL MEDICINE

## 2018-07-09 PROCEDURE — 82565 ASSAY OF CREATININE: CPT

## 2018-07-09 RX ADMIN — IOPAMIDOL 75 ML: 612 INJECTION, SOLUTION INTRAVENOUS at 13:55

## 2018-07-16 ENCOUNTER — OFFICE VISIT (OUTPATIENT)
Dept: ONCOLOGY | Facility: CLINIC | Age: 78
End: 2018-07-16

## 2018-07-16 ENCOUNTER — APPOINTMENT (OUTPATIENT)
Dept: OTHER | Facility: HOSPITAL | Age: 78
End: 2018-07-16

## 2018-07-16 VITALS
RESPIRATION RATE: 14 BRPM | SYSTOLIC BLOOD PRESSURE: 149 MMHG | DIASTOLIC BLOOD PRESSURE: 81 MMHG | OXYGEN SATURATION: 95 % | BODY MASS INDEX: 28.04 KG/M2 | HEART RATE: 89 BPM | TEMPERATURE: 97.7 F | WEIGHT: 148.5 LBS | HEIGHT: 61 IN

## 2018-07-16 DIAGNOSIS — Z17.1 MALIGNANT NEOPLASM OF OVERLAPPING SITES OF RIGHT BREAST IN FEMALE, ESTROGEN RECEPTOR NEGATIVE (HCC): Primary | ICD-10-CM

## 2018-07-16 DIAGNOSIS — C50.811 MALIGNANT NEOPLASM OF OVERLAPPING SITES OF RIGHT BREAST IN FEMALE, ESTROGEN RECEPTOR NEGATIVE (HCC): Primary | ICD-10-CM

## 2018-07-16 PROCEDURE — G0463 HOSPITAL OUTPT CLINIC VISIT: HCPCS | Performed by: INTERNAL MEDICINE

## 2018-07-16 PROCEDURE — 99214 OFFICE O/P EST MOD 30 MIN: CPT | Performed by: INTERNAL MEDICINE

## 2018-07-16 NOTE — PROGRESS NOTES
Subjective .     REASONS FOR FOLLOWUP:    1. Stage IIIA (T1N2M0) right breast cancer status post bilateral mastectomies 10/13/2008, ER negative, NJ positive, HER-2/stanley 3+.  Status post adjuvant chemotherapy with Adriamycin and Cytoxan x 4 cycles plus Taxotere x 4 cycles completed 06/24/2009. Initiation of adjuvant Arimidex in 07/2009 with plans to treat for 5 years.     2. Herceptin was discontinued after 10 weeks of therapy due to asymptomatic decrease in ejection fraction from 55% to 41%.  Ejection fraction recovered to 54%, however.  Herceptin not resumed.   3. The patient finished adjuvant radiation therapy 12/2009 through 02/2010 by Dr. Yuri Joyce.   4. Left upper extremity DVT secondary to Mediport, treated with Coumadin from February 2011 until April 2011.    5. History of iron deficiency anemia.  6. Intermittent mild leukopenia since completion of chemotherapy.    7. Osteopenia, treated with Reclast 01/30/2013 and 2/21/14.  Continuation of calcium and vitamin D supplementation.  Repeat DEXA bone density scan reported stable osteopenia on 02/14/2014 (maximal T score -2.1).     HISTORY OF PRESENT ILLNESS:  The patient is a 78 y.o. year old female who is here for follow-up with the above-mentioned history.    History of Present Illness the patient returns today for a much delayed follow-up visit.  I last saw the patient 12/2/16.  At that time, given the patient's questionable NJ positivity and ER negativity, we have discussed whether or not to continue on with additional aromatase inhibitor therapy.  She was experiencing significant arthritic pain in her back and knees with concern that these were being exacerbated by Arimidex.  She was to have returned to review a repeat DEXA scan and make a final decision in early 2017 regarding any continuation of endocrine therapy.  The patient however it appears has not continued on any further aromatase inhibitor therapy.  She has been living in an assisted living  facility for the past 14 months.  She reports now that she is going to be moving in the next few weeks to [place] near [place] where her daughter resides and they're willing to be assisting in her care.  The patient did undergo recent evaluation with cardiology and did undergo an echocardiogram on 6/25/18 showed an ejection fraction depressed at 40 per, also showing a trivial pericardial effusion.  She contacted our office prior to her scheduled visit today reporting some chest wall discomfort in the area of her mastectomies.  She did return for a nurse practitioner visit and we have requested a CT chest be performed prior to today's visit.    Today, the patient reports that the chest discomfort occurs mainly around her posterior axillary region bilaterally and does occasionally radiate into the anterior chest wall.  The pain is not new is been present intermittently for years.  She has not experienced any pain over the most recent few weeks.  She does note that the pain has a neuropathic quality occasionally feeling as if it is pruritic however no skin rash has been evident.  She reports a normal appetite denies any weight loss.  She overall reports feeling well.      PAST MEDICAL HISTORY:    1. Diabetes mellitus with mild diabetic neuropathy involving hands and feet as well as retinopathy.  2. Hypertension.  3. Gastroesophageal reflux disease.  4. Osteoarthritis with chronic pain involving the knees with some limitation of mobility.  5. History of depression/anxiety.    6. Hyperlipidemia.   7. Chronic obstructive pulmonary disease/chronic bronchitis.    8. History of cerebral aneurysm by patient’s report with prior MRI performed at [facility].  The patient has seen Dr. Lizarraga referral from Dr. Cronin but did not followup regarding his final recommendations for treatment. Status post cholecystectomy in 2008.  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  The patient was referred back to Dr. Lizarraga who did not feel any immediate intervention was required in 12/2008.  9. Status post multiple hand surgeries including bilateral carpal tunnel surgery, right wrist/tendon surgery, release of trigger finger.  10. Status post partial hysterectomy and bilateral repair performed in late 1990s.  Ovaries remain in place.   11. Status post bilateral tubal ligation.    12. Osteopenia with bone density study from July 2008 showing evidence of normal density in lumbar spine, osteopenia in femoral neck on right with T score of -1.2 and on left -1.4.    13. Urinary tract infection after first cycle of Taxotere therapy in April 2009.  14. Increasing lower back pain radiating down her legs; referral to Dr. Gonzáles for possible epidural injections.  15. Chest wall cellulitis and fat necrosis on the right wall requiring surgery for debridement on 12/13/2008.  Recurrent seroma requiring repeat drainage by Dr. Britt.  Area of presumed fat necrosis being followed in the lateral aspect of the mastectomy incision on the right.  Past Medical History:   Diagnosis Date   • Anemia    • Anxiety    • Arthritis    • Cancer (CMS/[facility])     Stage IIIA, T1N2M0 right breast   • Cellulitis of right upper arm    • Cerebral aneurysm    • DDD (degenerative disc disease), lumbosacral    • Depression    • Diabetes mellitus (CMS/[facility])     type II   • Diabetic neuropathy (CMS/[facility])    • Difficulty walking    • DVT (deep venous thrombosis) (CMS/HCC) 2011    Left upper extremity   • GERD (gastroesophageal reflux disease)    • Heart failure (CMS/[facility])    • History of pneumonia    • History of urinary tract infection    • Hypercholesteremia    • Hyperlipidemia    • Hypertension    • Hypokalemia    • Lack of coordination    • Muscle weakness    • Osteopenia    • Synovial cyst of popliteal space (Baker), left knee    • Synovial cyst of popliteal space (Benavides), right knee     • Thrombocytopenia (CMS/HCC)      Past Surgical History:   Procedure Laterality Date   • APPENDECTOMY  2010   • CARPAL TUNNEL RELEASE Right    • CHOLECYSTECTOMY  2008   • HAND SURGERY  08/2006   • HYSTERECTOMY  1994   • MASTECTOMY Bilateral 10/13/2008   • TUBAL ABDOMINAL LIGATION           ONCOLOGIC/HEMATOLOGIC HISTORY    HEMATOLOGIC HISTORY:  The patient had a mild degree of anemia at her initial consultation with hemoglobin of 11.7.  Evaluation showed low normal ferritin level of 18, TIBC 343.  The patient was placed on iron sulfate 325 mg once a day.  She has had a colonoscopy performed within the past year.    During the hospitalization in April of 2009 the patient was given 1 dose of Venofer of 100 mg and she tolerated it well.      The patient had normal hemoglobin of 13.2 on 9-04-09.  Platelets and WBC are also normal.      The patient received adjuvant radiation therapy from December 2009 through February 2010 by Dr. Yuri Joyce.      Patient was reevaluated on 09/21/2010.  Physical examination revealed no lymphadenopathy, no suspicious lesions on chest or axillary bilaterally.  Hemoglobin is normal at 12.6.  No lower extremity edema.  No dyspnea.  Complains of fatigue, diabetes is not controlled.     Patient had CT scan on 02/11/11, with no evidence for disease recurrence of metastatic disease.  However, the patient was found incidentally with left IJ deep venous thrombosis.  We started the patient on Arixtra on 02/12/11.  The plan is to remove the zenon catheter and treat with full dose anticoagulation for 6-8 weeks.      The patient had Zenon Catheter removed on 02/28/11 by Dr. Britt.  The patient has been anticoagulated with Coumadin.  The plan is to have repeat venous duplex study eight weeks from the removal of the catheter.  The patient may stop anticoagulation at that time.    On 03/25/11 the patient had negative bone scan, also a brain MRI examination showing no metastatic disease.  She does have  a small 3 x 4 mm aneurysm, for which the patient has known about since 2008 at least.  Her headache has completely resolved.    Repeat venous duplex study showed residual left arm deep venous thrombosis with no deep venous thrombosis in the right arm.  The patient was seen on 04/19/11 and Coumadin was discontinued due to comorbidities and possible injuries with risk for bleeding.      ONCOLOGIC HISTORY:  Stage IIIA (T1,N2,M0) right breast cancer:  The patient underwent an initial biopsy on 10-08-08 of the right breast with pathology showing 0.5 cm focus of infiltrating ductal carcinoma, moderately differentiated with no evidence of lymphovascular invasion and no evidence of ductal carcinoma in situ.  The tumor is ER negative/HI positive at 10% only, and HER-2/stanley IHC 3+.   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  The specimen was sent out for further review with no evidence of residual invasive disease identified.  Margins were negative.  The patient had evidence of positive sentinel lymph node and total of 5 of 14 axillary lymph nodes involved with macro-metastatic disease but no extracapsular extension.   Specimen from right axillary lymph node was sent for repeat receptor analysis which was ER negative and HI negative and HER-2/tsanley 3+ by immunohistochemistry.   The patient is status post mastectomy on 10-13-08.  Due to the patient’s stage III disease, she underwent staging evaluation.  CT scan and PET scan showed no evidence of metastatic lesions and CA 27.29 was normal at 22.  She has had a MUGA scan showing a normal ejection fraction of 57%.      The patient developed bilateral seromas postoperatively which eventually required placement of a FERNANDO drain 11/13/08 at the same time she had her Mediport placed by Dr. Britt.  The patient was seen back on 11/20/08 requiring delay in her  adjuvant therapy due to the presence of the seromas.  We plan for adjuvant therapy utilizing AC chemotherapy every three weeks with Neulasta support x four cycles followed by adjuvant Taxotere chemotherapy every three weeks with Neulasta support x four cycles.  She will begin with Neulasta support due to her underlying history of chronic bronchitis and diabetes with significant risk for infectious complications.  We will not use Taxol due to her underlying diabetic neuropathy.  The patient is HER/2/stanley positive and will receive adjuvant Herceptin for one year at the conclusion of her chemotherapy.  Her tumor had low grade IA positivity at 10% and we will discuss the potential to receive hormonal therapy after completion of chemotherapy.  Given the extensive lymph node involvement, she will require adjuvant radiation to the chest wall and axilla following chemotherapy as well.  The patient had continued postoperative difficulties eventually requiring hospitalization from 12/04/08 to 12/20/08 with a revision of her right mastectomy site on 12/13/08.  Pathology showed no evidence of malignancy but did show inflammatory change and fat necrosis.  The patient was treated empirically with antibiotics for presumed cellulitis and wound infection.  She had reaccumulation of a postoperative seroma.  We will hopefully proceed on with adjuvant chemotherapy in early 01/09.  The patient is aware that due to the delay in our therapy, the effectiveness of her adjuvant chemotherapy will likely be diminished.  We will, however, attempt to proceed on.  We will plan Neulasta with the first cycle of chemotherapy.    We will plan to initiate cycle 1 of adjuvant chemotherapy on 1-12-09 with Neulasta support.     The patient was seen 02/02/09 with a second cycle of therapy given with Neulasta support.      The patient seen on 2/23/09 with her third cycle of therapy given with Neulasta support.      The patient was seen 3/16/09 with fourth  cycle of AC given with Neulasta support.  Thereafter I asked her to be restudied via CT chest, abdomen and pelvis and MRI of the brain before proceeding with the Taxotere portion of chemotherapy.    CT scans of chest, abdomen and pelvis on 4-01-09 shows resolving seroma in right chest wall.  There was no evidence of metastatic disease.  MRI of brain on 3-30-09 shows no evidence of metastatic disease.  The patient will proceed on with the adjuvant Taxotere portion of the chemotherapy.  She will initiate this on 4-08-09 with Decadron premedication and Neulasta support.  Following four cycles of Taxotere, we will initiate adjuvant Herceptin as well as radiation therapy and consider whether to pursue adjuvant hormonal therapy given the low grade KS positivity.       Patient was admitted to the hospital from 4/18 to 4/25/09 due to significant nausea, vomiting and diarrhea after the first cycle of Taxotere chemotherapy.  The patient was given IV fluid a few days prior to admission to the hospital but did not help much.  She was also found to have urinary tract infection and was given IV Levaquin for several days in the hospital.  The patient was given supportive care including IV fluids and antiemetics together with antidiuretics.  The patient was negative for C-diff colitis.  At the time of discharge, the patient has much improved clinical condition.  She was discharged to the nursing home but due to the poor sanitary condition in the nursing home, the patient left there shortly after she arrived at the nursing home and stayed at home.    The patient was reevaluated 5/6/09.  The patient has reservation of further adjuvant chemotherapy.  Discussed with patient in detail the side effects and how we will manage this time with dose reduction of the Taxotere and schedule patient for Zofran 80 mg q. 8h. for 5 days together with prechemotherapy medication.  We will see the patient in 1 week for toxicity evaluation.  The patient  agrees to the current approach.  She wishes to resume chemotherapy after Mother’s Day.    Cycle #2 of Taxotere administered 5/11/09 at a 30% reduced dose.      Cycle 3 of Taxotere administered on 6-03-09 with Neulasta support.      Cycle #4 of Taxotere administered 6/24/09 with Neulasta support.  We will plan MUGA scan at the end of this cycle and discuss proceeding with adjuvant Herceptin and radiation therapy when the patient returns.    MUGA scan on 07/08/09 showed an ejection fraction of 55.5%.  Ejection fraction was 57% on 10/28/08 by MUGA study.    The patient was reevaluated on 07/16/09.  We discussed with the patient further therapy as previously mentioned by Dr. Lawson during the initial evaluation and treatment plan.  The patient will need radiation therapy to her chest and axillary area on the right side because of a high risk of recurrent disease.  Due to her HER-2 positive status, the patient will also need to start Herceptin treatment on a weekly basis.  The patient is weakly positive for DE at 10% but negative for ER.  Today we again discussed with the patient hormonal therapy with aromatase inhibitors which had been previously discussed.  Because of the possible side effect of bone density loss, we will request a baseline DEXA scan.  The patient was also instructed to continue taking oral calcium and vitamin D with the minimum requirement of calcium at 1,000 mg a day and 400 units of vitamin D daily.  The patient agreed with this arrangement and will be referred to radiation oncology for a consultation.  We will schedule her for Herceptin therapy in one week.  I also gave the patient a prescription for Arimidex 1 mg p.o. daily.    The patient will be referred to Dr. Richard Gonzáles for epidural injections to manage the pain to her lower back.  She does have an appointment scheduled 08/11/09.    The patient was started on weekly Herceptin on 7-30-09 and also Arimidex 1 mg p.o. q. day.  She had a bone  density scan on 7-20-09 which showed osteopenia in the lumbar spine as well as the left hip/left femoral neck.      [facility]      The patient was evaluated on 9-04-09 and denies any dyspnea, palpitations, or lower extremity edema.   We will continue weekly Herceptin.  We will arrange repeat MUGA scan to reassess her cardiac function to rule out asymptomatic deterioration of ejection fraction.  At the same, the patient will continue Arimidex together with daily vitamin D and calcium supplementation.      The patient had a repeat MUGA study on 10/09/09 with ejection fraction of 41-42% which is significantly lower than 55% on 07/08/09.  The patient is asymptomatic.  She has thus far received 10 weekly doses of Herceptin.  I discussed with the patient that we will hold the Herceptin for now and re-discuss the issue of Herceptin therapy after that.      The patient’s ejection fraction has improved from 41 to 42% up to 54% on MUGA scan 11/23/09.  The patient, however, was felt to have significant underlying risk for CHF given her underlying diabetes and it was felt that she was not a candidate for further Herceptin treatment.  She will continue on Arimidex for now.  The patient has had a significant delay in initiation of adjuvant radiation therapy and will be starting therapy with Dr. Yuri Joyce at [facility] on 12/1/09.      When the patient was seen 12/29/09 had been initiated on radiation therapy by Dr. Yuri Joyce at [facility] beginning 12/01/09.      Repeat DEXA scan 7/5/12 showed considerable worsening of bone density, decreasing by over 8% in the hips.  Maximum T score in the left femur was -2.0.  The patient was felt to be a candidate for Reclast.  We will check a vitamin D level.  The Reclast, however, will be delayed due to the need to pursue a dental procedure for a fractured tooth  in the left lower gum line.      Patient had her teeth extracted on the left lower side.  She was reevaluated 1/16/13.  Patient will arrange to be given Reclast.  She will continue oral vitamin D and calcium supplementation.    The patient received Reclast for osteopenia 01/30/2013.     Repeat bone density scan 02/14/2014 showed stable osteopenia. The patient will continue on annual Reclast.     She was evaluated on 02/21/2014, physical examination was negative for disease recurrence.  We also discussed extended treatment with hormonal blockage therapy due to her stage III breast cancer.  The patient agreed with this strategy.    When seen on 12/2/16, the patient was experiencing worsening arthralgias in her low back and extremities.  There is question whether this was related to her aromatase given her.  He was requested that she undergo repeat DEXA scan testing in order to help determine duration of AI therapy however she does not follow-up as scheduled and ultimately went off the medication in 2017.    Current Outpatient Prescriptions on File Prior to Visit   Medication Sig Dispense Refill   • ALPRAZolam (XANAX) 1 MG tablet Take 1 tablet by mouth At Night As Needed for Anxiety. sriram 30 tablet 0   • aspirin 81 MG EC tablet Take 81 mg by mouth Daily.     • atorvastatin (LIPITOR) 40 MG tablet Take 40 mg by mouth.     • carvedilol (COREG) 3.125 MG tablet Take 1 tablet by mouth 2 (Two) Times a Day. 60 tablet 11   • gabapentin (NEURONTIN) 300 MG capsule Take 300 mg by mouth 3 (Three) Times a Day.     • HYDROcodone-acetaminophen (NORCO) 5-325 MG per tablet Take 1 tablet by mouth Every 8 (Eight) Hours As Needed (pain). 90 tablet 0   • insulin glargine (LANTUS) 100 UNIT/ML injection Inject  under the skin daily.     • insulin lispro (HumaLOG) 100 UNIT/ML injection Inject  under the skin 3 (three) times a day before meals.     • lisinopril (PRINIVIL,ZESTRIL) 20 MG tablet 20 mg 2 (Two) Times a Day.     • Loratadine 10 MG  capsule Take 1 tablet by mouth Daily.     • meloxicam (MOBIC) 7.5 MG tablet      • mirtazapine (REMERON) 15 MG tablet Take 15 mg by mouth Every Night.     • Multiple Vitamins-Minerals (CENTRUM SILVER PO) Take  by mouth Daily.     • Omega-3 Fatty Acids (FISH OIL PO) Take 1,200 mg by mouth Daily.     • omeprazole (priLOSEC) 40 MG capsule Take 40 mg by mouth Daily.     • zolpidem (AMBIEN) 5 MG tablet Take 5 mg by mouth At Night As Needed for Sleep.     • [DISCONTINUED] anastrozole (ARIMIDEX) 1 MG tablet TAKE 1 TABLET BY MOUTH ONCE DAILY 30 tablet 2     No current facility-administered medications on file prior to visit.        ALLERGIES:     Allergies   Allergen Reactions   • Benadryl [Diphenhydramine] Hallucinations   • Diphenhydramine Hcl Hallucinations     Hallucinations     SOCIAL HISTORY:    The patient is  and lives alone.   She has a daughter who is involved in her care but lives in [place].   She has another daughter who lives in [place] who commutes daily to [place] to work.   The patient has a history of smoking one-half pack per day for 10 years and quit around .     Social History     Social History   • Marital status:      Spouse name: N/A   • Number of children: N/A   • Years of education: college     Occupational History   •  @ [employer]     Social History Main Topics   • Smoking status: Former Smoker     Packs/day: 0.50     Years: 10.00     Types: Cigarettes   • Smokeless tobacco: Never Used   • Alcohol use No   • Drug use: No   • Sexual activity: Not on file     Other Topics Concern   • Not on file     Social History Narrative   • No narrative on file         FAMILY HISTORY:   Significant for a brother with lung cancer at age 55.  There is no FH of breast cancer or ovarian cancer.   The patient does note a FH of easy bruising although her mother who had easy bruising was on steroids.   Her father  of coronary artery disease.   Mother and brother both had  diabetes.    Family History   Problem Relation Age of Onset   • Diabetes Mother    • Asthma Mother    • Heart disease Father    • Heart attack Father    • Lung cancer Brother    • Diabetes Brother    • Heart attack Brother    • Heart disease Brother    • Gallbladder disease Daughter    • Hypertension Other    • Heart disease Sister    • Heart attack Sister        Review of Systems   Constitutional: Positive for fatigue. Negative for activity change, appetite change, fever and unexpected weight change.   HENT: Negative for congestion, mouth sores, nosebleeds, sore throat and voice change.    Respiratory: Negative for cough, shortness of breath and wheezing.    Cardiovascular: Negative for chest pain, palpitations and leg swelling.   Gastrointestinal: Negative for abdominal distention, abdominal pain, blood in stool, constipation, diarrhea, nausea and vomiting.   Endocrine: Negative for cold intolerance and heat intolerance.   Genitourinary: Negative for difficulty urinating, dysuria, frequency and hematuria.   Musculoskeletal: Positive for arthralgias and back pain. Negative for joint swelling and myalgias.   Skin: Negative for rash.   Neurological: Negative for dizziness, syncope, weakness, light-headedness, numbness and headaches.   Hematological: Negative for adenopathy. Does not bruise/bleed easily.   Psychiatric/Behavioral: Negative for confusion and sleep disturbance. The patient is not nervous/anxious.          Objective      Vitals:    07/16/18 0849   BP: 149/81   Pulse: 89   Resp: 14   Temp: 97.7 °F (36.5 °C)   SpO2: 95%      Current Status 7/16/2018   ECOG score 0   Pain 0/10    Physical Exam   Constitutional: She is oriented to person, place, and time. She appears well-developed and well-nourished.   HENT:   Mouth/Throat: Oropharynx is clear and moist.   Eyes: Conjunctivae are normal.   Neck: No thyromegaly present.   Cardiovascular: Normal rate and regular rhythm.  Exam reveals no gallop and no friction  rub.    No murmur heard.  Pulmonary/Chest: Breath sounds normal. No respiratory distress. She exhibits no mass (status post bilateral mastectomy.  Chest wall bilaterally is normal without masses no abnormalities palpated.).   Abdominal: Soft. Bowel sounds are normal. She exhibits no distension. There is no tenderness.   Musculoskeletal: She exhibits no edema.   Lymphadenopathy:        Head (right side): No submandibular adenopathy present.     She has no cervical adenopathy.     She has no axillary adenopathy.        Right: No inguinal and no supraclavicular adenopathy present.        Left: No inguinal and no supraclavicular adenopathy present.   Neurological: She is alert and oriented to person, place, and time. She displays normal reflexes. No cranial nerve deficit. She exhibits normal muscle tone.   Skin: Skin is warm and dry. No rash noted.   Psychiatric: She has a normal mood and affect. Her behavior is normal.       RECENT LABS:  Hematology WBC   Date Value Ref Range Status   07/02/2018 5.83 4.00 - 10.00 10*3/mm3 Final     RBC   Date Value Ref Range Status   07/02/2018 4.09 3.90 - 5.00 10*6/mm3 Final     Hemoglobin   Date Value Ref Range Status   07/02/2018 12.1 11.5 - 14.9 g/dL Final     Hematocrit   Date Value Ref Range Status   07/02/2018 37.2 34.0 - 45.0 % Final     Platelets   Date Value Ref Range Status   07/02/2018 138 (L) 150 - 375 10*3/mm3 Final        Lab Results   Component Value Date    GLUCOSE 168 (H) 07/02/2018    BUN 19 07/02/2018    CREATININE 1.20 07/09/2018    EGFRIFNONA 62 07/02/2018    EGFRIFAFRI  09/23/2016      Comment:      <15 Indicative of kidney failure.    BCR 21.3 07/02/2018    CO2 27.8 07/02/2018    CALCIUM 9.7 07/02/2018    ALBUMIN 4.40 09/23/2016    LABIL2 1.4 09/23/2016    AST 32 09/23/2016    ALT 22 09/23/2016     CT chest 7/9/18:  IMPRESSION:  1.  Small pericardial effusion, otherwise no findings of acute  abnormality in the chest. No findings of disease recurrence  or  metastatic disease.  2.  Large hiatal hernia, as before.    Assessment/Plan    1. History of stage IIIA (T1N2M0) right breast cancer: status post multimodality therapy with bilateral mastectomy, adjuvant chemotherapy with Adriamycin and Cytoxan ×4 cycles followed by Taxotere ×4 cycles completed in June 2009. The patient was HER-2 positive and received 10 weeks of Herceptin therapy but able to tolerate Herceptin because of decline in ejection fraction and was discontinued.  The patient was ER negative, borderline VA positive (10%) on initial biopsy specimen.  On subsequent evaluation of lymph node specimen from her mastectomy she was ER negative, VA negative, and HER-2/stanley 3+.  She received anastrazole beginning in July 2009 due to her lack of ability to receive the full course of Herceptin and related to her borderline radius VA positivity. When seen in February 2014 plans were made to continue anastrazole post 5 years of therapy due to the high-risk nature of her disease.  When the patient was seen back however 12/2/16, she was experiencing significant musculoskeletal complaints with back pain and arthralgias which were felt to potentially be related to anastrozole.  We requested that she undergo a repeat DEXA scan in order to help make a determination of continuing on AI therapy.  She was to have returned in early 2017 however did not do so.  It appears that she has been off of Arimidex at least since early 2017.  We discussed today that she has no clinical or radiographic evidence of recurrent disease with her recent CT chest performed 7/9/18.  She was experiencing some chest wall discomfort which was likely postmastectomy pain.  Given the oral iron VA positivity on only 1 specimen, she is likely not going to gain any additional significant benefit from the duration of endocrine therapy and have recommended that she remain off treatment and continue on a course of observation.  She is going to be moving to  [place] in the next few weeks and I have suggested that she obtain a referral from her new primary care physician to a medical oncologist when she arrives.  2. Osteopenia: The patient's last DEXA scan was 2/11/14 with a maximal T score in the left femoral neck of -2.1.  She has received Zometa on 2 occasions, 1/30/13 and again in 2/21/14.  The patient did not pursue further monitoring of bone density as requested.  3. Severe osteoarthritis: The patient was experiencing severe pain in her low back and knees in late 2016.  Pain improved off Arimidex and potentially the drug was contributing to her discomfort.  4. Postmastectomy chest wall pain: The patient was experiencing some intermittent minor chest wall discomfort.  We did evaluate this with CT chest showing no evidence of recurrent disease area patient was reassured.  She has not experienced any of the pain in the past few weeks.  5. Pericardial effusion: This was noted on CT scan 7/9/18.  Fortunately, the patient has also recently undergone echocardiogram with cardiology on 6/25/18 that showed an ejection fraction of 40% and a trivial pericardial effusion.  We will not pursue this any further, unlikely to be malignant in nature.    Plan:  1. The patient will remain off Arimidex at this point and will continue on a course of observation alone.  2. As the patient is moving to [place] next few weeks, will not schedule any further follow-up in our office.  I did recommend that she get a referral from her new primary care physician in [facility] to medical oncology for annual follow-up.

## 2019-02-27 ENCOUNTER — TELEPHONE (OUTPATIENT)
Dept: ONCOLOGY | Facility: CLINIC | Age: 79
End: 2019-02-27

## 2019-02-27 NOTE — TELEPHONE ENCOUNTER
----- Message from Ai Denton sent at 2/27/2019  4:35 PM EST -----  Contact: [phone]  Pt has low platlets.

## 2019-02-27 NOTE — TELEPHONE ENCOUNTER
Pt called and said she had gone to the [facility] in [place] yesterday and was told she has a respiratory infection and also has low platelets. She said she was advised by that office to go to the ER but she didn't have a ride. She was also advised to follow up with Dr Lawson. Pt said she didn't call for an appointment because she doesn't have a ride here either. Pt does not know her platelet count and doesn't have any copies of her lab work. Reviewed bleeding precautions with her and she v/u. Pt will call around to see if she can get transportation and will call us tomorrow for an appt to see an NP. Dr Lawson was made aware. Message sent to Yvonne Watts to get the records from the [facility].

## 2020-03-13 ENCOUNTER — INPATIENT HOSPITAL (OUTPATIENT)
Dept: URBAN - METROPOLITAN AREA HOSPITAL 107 | Facility: HOSPITAL | Age: 80
End: 2020-03-13

## 2020-03-13 DIAGNOSIS — D50.9 IRON DEFICIENCY ANEMIA, UNSPECIFIED: ICD-10-CM

## 2020-03-13 DIAGNOSIS — K59.00 CONSTIPATION, UNSPECIFIED: ICD-10-CM

## 2020-03-13 PROCEDURE — 99223 1ST HOSP IP/OBS HIGH 75: CPT | Performed by: INTERNAL MEDICINE

## 2020-03-16 ENCOUNTER — INPATIENT HOSPITAL (OUTPATIENT)
Dept: URBAN - METROPOLITAN AREA HOSPITAL 107 | Facility: HOSPITAL | Age: 80
End: 2020-03-16

## 2020-03-16 DIAGNOSIS — K25.9 GASTRIC ULCER, UNSPECIFIED AS ACUTE OR CHRONIC, WITHOUT HEMO: ICD-10-CM

## 2020-03-16 DIAGNOSIS — K44.9 DIAPHRAGMATIC HERNIA WITHOUT OBSTRUCTION OR GANGRENE: ICD-10-CM

## 2020-03-16 DIAGNOSIS — D12.0 BENIGN NEOPLASM OF CECUM: ICD-10-CM

## 2020-03-16 DIAGNOSIS — D50.9 IRON DEFICIENCY ANEMIA, UNSPECIFIED: ICD-10-CM

## 2020-03-16 PROCEDURE — 45380 COLONOSCOPY AND BIOPSY: CPT | Mod: 59 | Performed by: INTERNAL MEDICINE

## 2020-03-16 PROCEDURE — 43239 EGD BIOPSY SINGLE/MULTIPLE: CPT | Performed by: INTERNAL MEDICINE

## 2020-03-16 PROCEDURE — 45385 COLONOSCOPY W/LESION REMOVAL: CPT | Performed by: INTERNAL MEDICINE
